# Patient Record
Sex: MALE | Race: WHITE
[De-identification: names, ages, dates, MRNs, and addresses within clinical notes are randomized per-mention and may not be internally consistent; named-entity substitution may affect disease eponyms.]

---

## 2019-02-20 ENCOUNTER — HOSPITAL ENCOUNTER (EMERGENCY)
Dept: HOSPITAL 12 - ER | Age: 64
Discharge: HOME | End: 2019-02-20
Payer: COMMERCIAL

## 2019-02-20 VITALS — WEIGHT: 205 LBS | HEIGHT: 71 IN | BODY MASS INDEX: 28.7 KG/M2

## 2019-02-20 VITALS — DIASTOLIC BLOOD PRESSURE: 78 MMHG | SYSTOLIC BLOOD PRESSURE: 131 MMHG

## 2019-02-20 DIAGNOSIS — K21.9: ICD-10-CM

## 2019-02-20 DIAGNOSIS — E11.9: ICD-10-CM

## 2019-02-20 DIAGNOSIS — L03.011: Primary | ICD-10-CM

## 2019-02-20 DIAGNOSIS — Z88.0: ICD-10-CM

## 2019-02-20 DIAGNOSIS — Z88.8: ICD-10-CM

## 2019-02-20 PROCEDURE — A4663 DIALYSIS BLOOD PRESSURE CUFF: HCPCS

## 2019-02-20 NOTE — NUR
Patient [id number]. Speaking in complete sentences. Speech clear. no neuro deficits 
noted. Patient comes in with c/o R hand injury s/p sustaining a puncture wound 
to 2nd digit. Patient states he sustained a scab which he removed this morning 
@ 1100. Patient states he now has 7/10 pain with limited ROM to the affected 
area. No bleeding/drainage is noted. No open wound is noted. Patient is 
concerned he may have retained foreign material to the area. Last Tdap UNK. 



No cardiovascular distress noted. Respirtations even and unlabored. No 
SOB/congestion noted. No c/o GI/ distress noted. patient ambulated to room 
with stable gait. Visitor at bedside. 



Patient in bed, siderails up. Bed in lowest position. Fall precautions in place 
per protocol.

## 2021-08-30 ENCOUNTER — HOSPITAL ENCOUNTER (EMERGENCY)
Dept: HOSPITAL 12 - ER | Age: 66
Discharge: HOME | End: 2021-08-30
Payer: MEDICARE

## 2021-08-30 VITALS — DIASTOLIC BLOOD PRESSURE: 79 MMHG | SYSTOLIC BLOOD PRESSURE: 141 MMHG

## 2021-08-30 VITALS — WEIGHT: 215 LBS | HEIGHT: 71 IN | BODY MASS INDEX: 30.1 KG/M2

## 2021-08-30 DIAGNOSIS — Z98.1: ICD-10-CM

## 2021-08-30 DIAGNOSIS — M79.605: Primary | ICD-10-CM
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
## 2021-08-30 DIAGNOSIS — Q79.60: ICD-10-CM

## 2021-08-30 DIAGNOSIS — E21.3: ICD-10-CM

## 2021-08-30 DIAGNOSIS — Z79.4: ICD-10-CM

## 2021-08-30 DIAGNOSIS — Z88.8: ICD-10-CM

## 2021-08-30 DIAGNOSIS — S89.92XS: ICD-10-CM

## 2021-08-30 DIAGNOSIS — Z79.899: ICD-10-CM

## 2021-08-30 DIAGNOSIS — E11.9: ICD-10-CM

## 2021-08-30 LAB
ALP SERPL-CCNC: 49 U/L (ref 50–136)
ALT SERPL W/O P-5'-P-CCNC: 32 U/L (ref 16–63)
AST SERPL-CCNC: 17 U/L (ref 15–37)
BILIRUB DIRECT SERPL-MCNC: 0.1 MG/DL (ref 0–0.2)
BILIRUB SERPL-MCNC: 0.2 MG/DL (ref 0.2–1)
BUN SERPL-MCNC: 24 MG/DL (ref 7–18)
CHLORIDE SERPL-SCNC: 105 MMOL/L (ref 98–107)
CO2 SERPL-SCNC: 27 MMOL/L (ref 21–32)
CREAT SERPL-MCNC: 1.4 MG/DL (ref 0.6–1.3)
GLUCOSE SERPL-MCNC: 181 MG/DL (ref 74–106)
HCT VFR BLD AUTO: 42.6 % (ref 36.7–47.1)
MCH RBC QN AUTO: 28.3 UUG (ref 23.8–33.4)
MCV RBC AUTO: 87.6 FL (ref 73–96.2)
PLATELET # BLD AUTO: 237 K/UL (ref 152–348)
POTASSIUM SERPL-SCNC: 3.8 MMOL/L (ref 3.5–5.1)
WS STN SPEC: 6.7 G/DL (ref 6.4–8.2)

## 2021-08-30 PROCEDURE — A4663 DIALYSIS BLOOD PRESSURE CUFF: HCPCS
